# Patient Record
(demographics unavailable — no encounter records)

---

## 2017-01-10 NOTE — NUR
RECEIVED TO 2126 FROM ER VIA WHEELCHAIR, AAOX3, SKIN WARM AND DRY, RESP
UNLABORED, IV PATENT TO RIGHT FOREARM, VISITOR AT BEDSIDE, TELEMETRY SHOWING
SINUS, NO DISTRESS NOTED

## 2017-01-10 NOTE — HEMODYNAMI
PATIENT:KARIE ARMSTRONG                                  MEDICAL RECORD: [id number]
: 10/24/37                                            [id number]                                       ADMISSION DATE: 01/10/17
 
 
 Generatedon:201711:33
Patient name: KARIE ARMSTRONG Patient #: [id number] Visit #: [id number] SSN: 3
52- :
1937 Date of study: 2017
Page: Of
Hemodynamic Procedure Report
****************************
Patient Data
Patient Demographics
Procedure consent was obtained
First Name: KARIE          Gender: Female
Last Name: PATTI         : 1937
Patient #: [id number]       Age: 79 year(s)
Visit #: [id number]       Race: Unknown
SSN: 
Accession #:
[id number]
Additional ID: [id number]
Contact details
Address: [address] [place] [address]  Phone: [phone]
State: [place]
City: [place]
Zip code: [address]
Past Medical History
Allergies
Allergen        Reaction        Date         Comments
Reported
Other allergy                   2017    Sulfa
Admission
Admission Data
Admission Date: 1/10/2017   Admission Time: 22:39
Arrival Date: 2017     Arrival Time: 10:00
Admit Source: Emergency
department
Room #: D.2126
Height (in.): 65            BSA: 1.59 (m2)
Height (cm.): 165.1         BMI: 19.81 (kg/m2)
Weight (lbs.): 119.05
Weight (kg.): 54
Lab Results
Lab Result Date: 2017  Lab Result Time: 0:00
Biochemistry
Name         Units    Result                Min      Max
BUN          mg/dl    22       --(----)-*   7        18
Creatinine   mg/dl    1.3      --(---*)--   0.6      1.3
CBC
Name         Units    Result                Min      Max
Hemoglobin   g/dl     14.3     --(*---)--   13.5     17.5
Procedure
Procedure Types
Cath Procedure
Diagnostic Procedure
[facility]
LH w/Coronaries
 
FFR/IVUS
PCI Procedure
Coronary Stent Initial
Procedure Description
Procedure Date
Procedure Date: 2017
Procedure Start Time: 11:09
Procedure End Time: 11:30
Procedure Staff
Name                            Function
Oleg Joel MD                Performing Physician
Marietta Combs RT             Scrub
Masoud Mckeon RN                  Nurse
Daniel Hay RN                Circulator
Cathy Herrera RT               Monitor
Agus Carter RT                  Monitor
Procedure Data
Cath Procedure
Fluoroscopy
Diagnostic fluoroscopy      Total fluoroscopy Time: 5.3
time: 5.3 min               min
Diagnostic fluoroscopy      Total fluoroscopy dose: 411
dose: 411 mGy               mGy
Contrast Material
Contrast Material Type                       Amount (ml)
Isovue 300                                   87
Entry Location
Entry     Primary  Successful  Side  Size  Upsize Upsize Entry    Closure     Barrett
ccessful  Closure
Location                             (Fr)  1 (Fr) 2 (Fr) Remarks  Device        
          Remarks
Radial                         Right 6 Fr
artery                               Short
Femoral                        Right 6 Fr                         Mechanical    
          tr band
artery                               Short                        Compression
Estimated blood loss: 10 ml
Diagnostic catheters
Device Type               Used For           End Catheter
Placement
Terumo 5Fr Tiger 110cm    LV Angiography
catheter
Terumo 5Fr Denver 110cm    Right Coronary
catheter                  Angiography
Terumo 5Fr Denver 110cm    Left Coronary
catheter                  Angiography
Procedure Complications
No complications
Procedure Medications
Medication           Administration Route Dosage
Oxygen               NC                   2 l/min
Zofran               I.V.                 4 mg
0.9% NaCl            I.V.                 100 ml/hr
Lidocaine 2%         added to field       20
Heparin Flush Bag    added to field       2 bags
(1000units/500ml NS)
Versed               I.V.                 1 mg
Fentanyl             I.V.                 50 mcg
Radial Cocktail      I.A.                 1 syringe
(Verapomil 2mg/Nitro
 
400mcg/Heparin
1500units)
Versed               I.V.                 1 mg
Fentanyl             I.V.                 50 mcg
Heparin Bolus        I.V.                 4000 units
Versed               I.V.                 0.5 mg
0.9% NaCl            I.V. bolus           250 ml
Hemodynamics
Rest
BSA: 1.59 (m2) HGB: 14.3 (g/dl) O2 Consumption: Estimated: 162.03 (ml/min) O2 Co
nsumption indexed:
Estimated:101.91 (ml/min/m) Heart Rate: 101 (bpm)
Pressure Samples
Time     Site     Value (mmHg) Purpose      Heart      Use
Rate(bpm)
11:12    LV       67/12,12     Snapshot     83
Snapshots
Pre Cath      Intra         NCS           Post Cath
Vital Signs
Time     Heart  Resp   SPO2 NIBP (mmHg) Rhythm  Pain    Sedation
Rate   (ipm)  (%)                      Status  Level
(bpm)
10:46:23 99     17     100  148/82(114) NSR     0 (11)  10(A)
, No
pain
10:50:35 98     16     100  147/79(113) NSR     0 (11)  10(A)
, No
pain
10:54:49 92     15     99   135/74(103) NSR     0 (11)  10(A)
, No
pain
10:59:01 91     18     99   119/64(92)  NSR     0 (11)  9(A)
, No
pain
11:03:05 93     17     99   137/72(96)  NSR     0 (11)  9(A)
, No
pain
11:08:19 89     16     99   99/62(75)   NSR     0 (11)  9(A)
, No
pain
11:11:23 88     15     99   93/52(70)   NSR     0 (11)  9(A)
, No
pain
11:15:25 92     17     98   87/49(80)   NSR     0 (11)  9(A)
, No
pain
11:19:24 88     16     99   89/48(65)   NSR     0 (11)  9(A)
, No
pain
11:23:22 86     15     98   98/58(76)   NSR     0 (11)  9(A)
, No
pain
11:27:23 83     16     98   115/57(81)  NSR     0 (11)  10(A)
, No
pain
Medications
Time     Medication       Route  Dose    Verified Delivered Reason          Note
s    Effectiveness
by       by
10:45:17 Oxygen           NC     2 l/min Oleg  Buffie    used for
 
Marycruz Mckeon RN   procedure
10:45:25 Zofran           I.V.   4 mg    Olegalberto Meredith    Per physician   pt
Marycruz Mckeon RN                   states
n/v with
sedation
10:45:34 0.9% NaCl        I.V.   100     Oleg  Buffie    Per physician
ml/hr   Marycruz Mckeon RN
10:56:35 Lidocaine 2%     added  20ml    Oleg Dejesus   for local
to     vial    Marycruz Joel MD  anesthetic
field
10:56:41 Heparin Flush    added  2 bags  Oleg Dejesus   used for
Bag              to             Marycruz Joel MD  procedure
(1000units/500ml field
NS)
11:07:31 Versed           I.V.   1 mg    Oleg  Buffie    for sedation
Marycruz Mckeon RN
11:07:38 Fentanyl         I.V.   50 mcg  Olegalberto Weinbergie    for sedation
Marycruz Mckeon RN
11:09:15 0.9% NaCl        I.V.   250 ml  Oleg  Buffie    Per physician
bolus          Marycruz Mckeon RN
11:11:08 Radial Cocktail  I.A.   1       Oleg Dejesus   for
(Verapomil              syringe Marycruz Joel MD  vasodilation
2mg/Nitro
400mcg/Heparin
1500units)
11:11:15 Versed           I.V.   1 mg    Oleg Meredith    for sedation
Marycruz Mckeon RN
11:11:18 Fentanyl         I.V.   50 mcg  Oleg Meredith    for sedation
Marycruz Mckeon RN
11:16:22 Heparin Bolus    I.V.   4000    Oleg Meredith    for             veri
fied
units   Marycruz Mckeon RN   anticoagulation with dr joel
11:19:34 Versed           I.V.   0.5 mg  Oleg Meredith    for sedation
Marycruz Mckeon RN
Procedure Log
Time     Note
10:10:36 Daneil Hay RN sent for patient. Start room use.
10:27:06 Diagnostic Cath Status : Elective
10:27:31 Admit Source: Emergency department
10:27:43 Time tracking: Regular hours
10:27:48 Plan of Care:Hemodynamics will remain stable., Cardiac
rhythm will remain stable., Comfort level will be
maintained., Respiratory function will remain
adequate., Patient/ family verbilizes understanding of
procedure., Procedure tolerated without complication.,
Recovers from procedure without complications..
10:33:26 Patient received from Med II to CCL 2 Alert and
oriented. Tansferred to table in Supine position.
10:33:29 Warm blankets applied, and jamir hugger turned on for
patient comfort.
10:33:33 Correct patient and procedure confirmed by team.
10:33:35 Signed procedure consent form obtained from patient.
10:33:48 H&P Date Dictated: 2017 Within 30 days and on
chart..
10:33:53 Pre-procedure instructions explained to patient.
10:33:56 Pre-op teaching completed and patient verbalized
understanding.
10:33:58 Family in waiting room.
10:34:00 Patient NPO since Midnight.
 
10:34:14 Patient allergic to Other allergySulfa
10:34:23 Is the patient allergic to Iodine/contrast media? No.
10:34:27 Is patient on blood thinner?Yes
10:34:33 **ACC** The patient was administered the following
blood thiners within the last 24 hours: **ACC**Plavix
10:36:03 Patient diabetic? No.
10:36:12 Previous problem with sedation/anesthesia? N/A nausea
10:36:15 Snore? Unknown
10:36:17 Sleep apnea? Yes
10:36:19 Deviated septum? No
10:36:21 Opens mouth fully? Yes
10:36:24 Sticks out tongue? Yes
10:36:32 Dentures? No ?
10:37:06 IV right forearm D/C'd due to need to relocate for
procedure..
10:37:29 IV started by Masoud Mckeon RN inleft forearm with a 22
gauge IV catheter with 0.[facility]% [facility] at [facility].
10:38:06 Lab Result : BUN 22 mg/dl
10:38:06 Lab Result : Hemoglobin 14.3 g/dl
10:38:06 Lab Result : Creatinine 1.3 mg/dl
10:40:10 Lab results completed and on chart.
10:40:15 Right Radial & Right Groin area was prepped with
chlora-prep and draped in sterile fashion
10:40:20 Alarms reviewed by R. N.
10:40:21 Sharps counted by scrub and verified by R.N.
10:41:20 ECG and BP/O2 sat monitors applied to patient.
10:41:22 Vital chart was started
10:41:30 Baseline sample Acquired.
10:41:39 Rhythm: sinus rhythm
10:41:41 Full Disclosure recording started
10:45:17 Oxygen 2 l/min NC was given by Masoud Mckeon RN; used
for procedure;
10:45:25 Zofran 4 mg I.V. was given by Masoud Mckeon RN; Per
physician; pt states n/v with sedation
10:45:34 0.9% NaCl 100 ml/hr I.V. was given by Masoud Mckeon RN;
Per physician;
10:51:10 Arrival Date: 2017 10:00:00 AM
10:51:29 Patient Height : 65 cm
10:51:35 Patient Weight : 119.05 kg
10:53:09 Physician paged
10:56:35 Lidocaine 2% 20ml vial added to field was given by
Oleg Joel MD; for local anesthetic;
10:56:41 Heparin Flush Bag (1000units/500ml NS) 2 bags added to
field was given by Oleg Joel MD; used for
procedure;
10:56:43 Use device set Radial Dx
10:56:44 Acist Syringe opened to sterile field.
10:56:44 Cardinal Cath Pack opened to sterile field.
10:56:45 Bag Decanter opened to sterile field.
10:56:45 Terumo 6Fr Slender Glidesheath opened to sterile
field.
10:56:47 St Colin 260cm J .035 wire opened to sterile field.
10:56:48 Acist Hand Control opened to sterile field.
10:56:49 Acist Manifold opened to sterile field.
10:56:51 Tegaderm 4 x 4 opened to sterile field.
10:57:38 Physician responded to page.
10:59:17 Zero performed for pressure channel P1
11:06:59 Physician arrived
11::59 --------ALL STOP TIME OUT------
11:07:00 Final Timeout: patient, procedure, and site verified
 
with staff and physician. All members of the team are
in agreement.
11:07:04 Right Radial & Right Groin site verified by team.
11:07:07 Physical assessment completed. ASA score P 2 - A
patient with mild systemic disease as per Oleg Joel MD.
11:07:12 Sedation plan: IV Moderate Sedation Versed, Fentanyl
11:07:31 Versed 1 mg I.V. was given by Masoud Mckeon RN; for
sedation;
11::38 Fentanyl 50 mcg I.V. was given by Masoud Mckeon RN; for
sedation;
11:09:09 Procedure started.
11:09:15 0.9% NaCl 250 ml I.V. bolus was given by Masoud Mckeon
RN; Per physician;
11:09:24 Local anesthetic to right radial artery with Lidocaine
2% by Oleg Joel MD.**INITIAL ACCESS ONLY**
11:09:38 A 6 Fr Short sheath was inserted into the Right Radial
artery
11:10:03 J wire advanced.
11:11:06 A Terumo 5Fr Denver 110cm catheter was advanced over
the wire and used for LV Angiography.
11:11:08 Radial Cocktail (Verapomil 2mg/Nitro 400mcg/Heparin
1500units) 1 syringe I.A. was given by Oleg Joel MD; for vasodilation;
11:11:15 Versed 1 mg I.V. was given by Buffie Mckeon RN; for
sedation;
11:11:18 Fentanyl 50 mcg I.V. was given by Masoud Mckeon RN; for
sedation;
11:12:28 EF : 55 %
11:12:30 LV Function : Normal
11:12:48 EF : 55 %
11:13:06 A Terumo 5Fr Denver 110cm catheter was advanced over
the wire and used for Right Coronary Angiography.
11:13:24 A Terumo 5Fr Tiger 110cm catheter was advanced over
the wire and used for Left Coronary Angiography.
11:14:19 Volcano Platinum Eagleye IVUS Catheter opened to
sterile field.
11:15:28 Cordis 6FR XBLAD 3.5 guide catheter opened to sterile
field.
11:15:29 Merit BasixCompak Inflation Kit opened to sterile
field.
11:16:15 Mathew Whisper J 300cm 0.014 guide wire opened to
sterile field.
11:16:22 Heparin Bolus 4000 units I.V. was given by Masoud Mckeon
RN; for anticoagulation; verified with dr joel
11:16:53 Terumo 6Fr Sonora Sheath opened to sterile field.
11:17:05 Local anesthetic to right femoral artery with
Lidocaine 2% by Oleg Joel MD.**ADDITIONAL ACCESS**
11:17:57 unable to advance guide through radial artery. Moved
to Femoral artery.
11:18:40 St Colin 260cm J .035 wire opened to sterile field.
11:19:03 A 6 Fr Short sheath was inserted into the Right
Femoral artery
11:19:15 6 Fr XBLAD 3.5 guide catheter was inserted over the
wire
11:19:34 Versed 0.5 mg I.V. was given by Masoud Mckeon RN; for
sedation;
11:19:38 Whisper wire advanced.
11:19:47 IVUS catheter advanced over wire.
11:19:52 IVUS pass to LAD lesion performed.
 
11:21:05 IVUS catheter removed over wire.
11:21:36 PCI Cath status Elective
11:22:37 **ACC** PCI Site: pLAD has 65% stenosis.
11:22:41 **ACC** Pre-intervention DENIZ Flow is 3.
11:22:51 Wire advanced across lesion.
11:25:08 Inflation Number: 1 A Medtronic Resolute 2.5 X 14
stent was prepped and advanced across the Mid LAD. The
stent was deployed at 17 CONG for 0:00 (min:sec).
11::24 Stent catheter was removed intact over wire.
11::30 Wire removed.
11::31 Guide catheter removed.
11:27:10 Terumo TR Band Standard opened to sterile field.
11:27:16 Cordis 6Fr Exoseal opened to sterile field.
11:27:34 Sheath removed intact; hemostasis achieved with
Mechanical Compression to the Right Femoral artery.
11::57 Procedure ended.(Physican Out)
:: Fluoroscopy time 05.30 minutes.
11::15 Fluoroscopy dose: 411 mGy
11::15 Flurop Dose total: 411
11::22 Contrast amount:Isovue 300 87ml.
11::24 Sharps counted by scrub and verified by R.N.
11:28:32 TR band inflated with 10cc of air.
11:28:37 Insertion/operative site no bleeding no hematoma.
11:28:46 Post-op/insertion site Right Femoral artery dressed
using a 4 x 4 and Tegaderm.
11:28:52 Post right femoral artery:stable
11:28:58 Post right radial artery:stable
11:29:00 Post Procedure Pulses reassessed and unchanged
11:29:06 Post-procedure physical assessment completed. ASA
score P 2 - A patient with mild systemic disease as
per Oleg Joel MD.
11:29:10 Post procedure rhythm: unchanged.
11:29:13 Estimated blood loss: 10 ml
11:29:15 Post procedure instruction explained to
patient.Patient verbalizes understanding.
11:29:16 Patient needs reinforcement of post procedure
teaching.
11:29:38 Procedure type changed to Cath procedure, Diagnostic
procedure, LHC, LHC w/Coronaries, FFR/IVUS, PCI
procedure, Coronary Stent Initial
11:29:40 Procedure and supply charges have been captured,
reviewed, submitted and are correct.
11::46 Procedure Complication : No complications
11::07 Vital chart was stopped
11::08 See physician's report for complete and final results.
11:30:14 Report given to [facility].
11:30:19 Patient transfered to [facility] with Stretcher.
11:30:21 Procedure ended.
11:30:21 Full Disclosure recording stopped
11::25 End room use (Document Last)
Intervention Summary
Intervention Notes
Time     ActionType  Lesion and  Equipment Action#  Pressure  Duration
Attributes  Used
11:25:08 Place stent Mid LAD     Medtronic 1        17        00:00
Resolute
2.5 X 14
stent
Device Usage
Item Name   Manufacture  Quantity  Catalog      Hospital Part    Current Minimal
 
 Lot# /
Number       Charge   Number  Stock   Stock   Serial#
Code
Acist       Acist        1         [id number]        454477   727299  [id number]  20
Syringe     Medical
Systems Inc
Cardinal    Cardinal     1         [id number]   502581   69430   318858  5
Cath Pack   Health
Bag         Microtek     1         2002S        831928   53024   865541  5
Decanter    Medical Inc.
Terumo 6Fr  Terumo       1         [id number]   646625   441188  486067  40
Slender
Glidesheath
St Colin     St Colin      2         349598       714601   169357  145172  30
260cm J
.035 wire
Acist Hand  Acist        1         56324        504889   330105  226570  5
Control     Medical
Systems Inc
Acist       Acist        1         01305        477537   306101  244664  5
Manifold    Medical
Systems Inc
Tegaderm 4  3M           1         1626W        316748   076786  151438  5
x 4
IV          Hospira      1         39406-79     288999   85982   791616  5
Extension
Set
Terumo 5Fr  Terumo       1               999526   105319  747029  5
Tiger 110cm
catheter
Volcano     [place]      1         67312Q       097993   814787  705621  8
Platinum
Eagleye
IVUS
Catheter
Cordis 6FR  Cardinal     1         [id number]     719563   133110  277653  10
XBLAD 3.5   Health
guide
catheter
Merit       Merit        1         [id number]       720498   416431  234383  15
[facility] Medical
Inflation
Kit
Mathew      Mathew       1         [id number]    076878   681840  890952  5
Whisper J   Vascular
300cm 0.014
guide wire
Terumo 6Fr  Terumo       1         [id number]       437641   202086  147633  40
Sonora
Sheath
Medtronic   Medtronic    1         [id number]  086398           071491  1      
 [id number]
Resolute
2.5 X 14
stent
Terumo TR   Terumo       1         [id number]    520006   546888  536696  40
Band
Standard
Cordis 6Fr  Cardinal     1                 396190   399045  416240  10
[facility]     [employer]
 
Signature Audit Jachin
Stage           Time        Signature      Unsigned
Intra-Procedure 2017   Cathy Herrera
11:33:24 AM RT(R)
Signatures
Monitor : Cathy Herrera Signature :
RT                       ______________________________
Date : ______________ Time :
______________
Monitor : Agus Carter RT Signature :
______________________________
Date : ______________ Time :
______________
 
 
 
 
 
 
 
 
 
 
 
 
 
 
 
 
 
 
 
 
 
 
 
 
 
 
 
 
 
 
 
 
 
 
 
 
 
 
 
 
 
 
[place] [address] [place] [address]

## 2017-01-11 NOTE — NUR
PT LAYING IN BED EYES CLOSED NO DISTRESS OBSERVED RESPERATIONS EVEN AND
UNLABORED BED LOW AND LOCKED SRX2 WILL MONITOR

## 2017-01-13 NOTE — OP
PATIENT NAME:  KARIE ARMSTRONG                           MEDICAL RECORD: [id number]
:10/24/37                                             LOCATION:D.ER           
                                                         ADMISSION DATE:        
SURGEON:  MICHI ELLIOTT MD             
 
 
DATE OF OPERATION:  2017
 
PROCEDURES:
1.  PTCA, stent LAD.
2.  Intravascular ultrasound of the LAD.
3.  Left heart catheterization.
4.  Selective coronary angiography.
5.  Left ventriculogram.
 
INDICATION:  Angina and coronary artery disease.
 
PROCEDURE IN DETAIL:  After informed consent was obtained and after detailed
explanation of risks, benefits, as well as alternative therapies, the patient
elected to proceed with angiogram and angioplasty.  The right femoral area was
prepped and draped in normal sterile fashion.  The right femoral artery was
cannulated via modified Seldinger technique with placement of 6-Andorran sheath. 
All catheters exchanged through this sheath.
 
FINDINGS:  The left ventriculogram was performed in the standard 30-degree ROSARIO
view reveals good cardiac wall motion throughout all segments.  Overall ejection
fraction estimated at 60%.
 
SELECTIVE CORONARY ANGIOGRAPHY:
1.  Left main showed no significant angiographic disease.
2.  Left anterior descending had irregularities proximally followed by a 75%
stenosis in the mid vessel.  The proximal irregularities, no greater than 30% by
intravascular ultrasound.
3.  Left circumflex shows moderate irregularities, but no flow-limiting
stenosis.
4.  Right coronary has moderate irregularities, but no flow-limiting stenosis.
 
PTCA STENT OF THE LAD:  The stent used was a 2.5 x 14-mm Resolute.  Result was
0% residual stenosis.
 
OVERALL IMPRESSION:  Successful percutaneous transluminal coronary angioplasty
stent of the left anterior descending going from greater than 70% initial
stenosis in the mid vessel to 0% residual.
 
TRANSINT:[id number] Voice Confirmation ID: [id number] DOCUMENT ID: [id number]
                                           
                                           MCIHI ELLIOTT MD             
 
 
 
Electronically Signed by MICHI ELLIOTT on 17 at 1111
CC:                                                             [id number]
DICTATION DATE: 17 1138     :     17 1159      [facility] CLI 
                                                                      17
[place] [id number]

## 2017-01-13 NOTE — CN
PATIENT NAME:KARIE ARMSTRONG                               MEDICAL RECORD: [id number]
: 10/24/37                                              LOCATION:.ER       
ADMIT DATE:                                                ACCOUNT: [id number]
CONSULTING PHYSICIAN:    MICHI ELLIOTT MD             
                                               
REFERRING PHYSICIAN:     RALPH GARCIA DO            
 
 
DATE OF CONSULTATION:  2017
 
Cardiology Consultation
 
DIAGNOSES:
1.  Non-Q-wave myocardial infarction.
2.  Coronary artery disease.
3.  Hypertension.
4.  Hyperlipidemia.
 
HISTORY OF PRESENT ILLNESS:  Mrs. Armstrong presents with 2 weeks of chest pain,
worsening.  Troponin is positive for a non-Q-wave myocardial infarction.  She
continues to have episodes of pain.  She does not have any acute EKG changes. 
She is on gemfibrozil for cholesterol, intolerant to statins in the past.  She
is on Calan for her blood pressure.  She has had bradycardia to the 50s since
being here.
 
PHYSICAL EXAMINATION:
GENERAL APPEARANCE:  Well-nourished, well-developed, appears stated age.  Level
of distress, comfortable. 
PSYCHIATRIC:  Mental status, alert, normal affect.  Orientation, oriented to
time, place and person.  
EYES:  Lids and conjunctiva, noninjected.  No discharge, no pallor. 
ENT:  Lips, teeth, gums, normal dentition.  Oropharynx, no cyanosis, no pallor. 
NECK:  Carotid arteries, bilateral normal upstroke, no bruits, no thrills. 
JUGULAR VEINS:  No jugular venous pressure or distention. 
CERVICAL LYMPH NODES:  Nontender, nonenlarged.  
THYROID:  Not enlarged.  Nontender.  No nodules. 
LUNGS:  Respiratory effort, unlabored. 
CHEST:  Normal curvature.  No thoracic deformity.  No chest wall tenderness. 
Percussion, resonant.  Auscultation, clear.  No wheezes, no rales, no rhonchi. 
CARDIOVASCULAR:  Precordial exam, nondisplaced.  No heaves or pericardial
thrills.  Rate and rhythm, regular.  Heart sounds, normal S1, normal S2.  No S3,
no gallop, no rub.  Systolic murmur, not heard.  Diastolic murmur, not heard. 
EXTREMITIES:  No cyanosis, no edema.  Peripheral pulses, full and equal in all
extremities, except as noted.  No bruits appreciated. 
ABDOMEN:  Soft, nondistended.  Normal aorta.  No bruit.  Nontender.  No masses. 
Liver, nontender, no hepatomegaly.  Spleen, nontender, no splenomegaly.  
MUSCULOSKELETAL:  No joint tenderness.  No joint swelling.  No erythema.  
NEUROLOGICAL:  Normal gait, normal strength, normal tone.
SKIN:  Warm and dry.
 
REVIEW OF SYSTEMS:  The patient reports easy bruising but reports no swollen
glands. The patient reports no fever, no night sweats, no significant weight
gain, no significant weight loss.  No significant exercise tolerance.  The
patient reports no dry eyes, no irritation, no vision change.  Patient reports
no difficulty hearing and no ear pain.  Patient reports no frequent nose bleeds
or nose and sinus problems.  Patient reports on arm pain on exertion.   No
shortness of breath while lying down.  No history of heart murmur.  Patient
reports no cough, no wheezing or coughing up blood.  Patient reports no
 
 
 
CONSULT REPORT                                 [id number]    SCHWKARIE BLAS             
 
 
abdominal pain, no vomiting.  Normal appetite.  No diarrhea and not vomiting
blood.  No nausea and no constipation.  Patient reports no incontinence.  No
difficulty urinating.  No hematuria.  No increased frequency.  Patient reports
no muscle aches.  No weakness, no arthralgias, no back pain.  No swelling of the
extremities.  Patient reports no abnormal mole, no jaundice, no rashes.  Reports
no loss of consciousness.  No weakness and no numbness.  No seizures, dizziness,
or headaches.  The patient reports no depression, no sleep disturbance, feeling
safe in a relationship and no alcohol abuse.  Patient reports on fatigue. 
Reports no runny nose or sinus pressure.  No itching, no hives, and no frequent
sneezing.  
 
OVERALL IMPRESSION:  Non-Q-wave myocardial infarction.  We will proceed with
coronary angiography.  We will load her with Plavix now.  Continue aspirin and
further care depends upon findings of the cardiac catheterization.  We will not
use a beta blocker due to heart rate in the 50s and bradycardia.  We will not
use a statin due to intolerance to this in the past.
 
TRANSINT:[id number] Voice Confirmation ID: [id number] DOCUMENT ID: [id number]
                                           
                                           MICHI ELLIOTT MD             
 
 
 
Electronically Signed by MICHI ELLIOTT on 17 at 1111
 
 
 
 
 
 
 
 
 
 
 
 
 
 
 
 
 
 
 
 
 
 
 
CC:                                                             [id number]
DICTATION DATE: 1734     :     17 0846      DEP CLI 
                                                                      [address]
[place] [address]

## 2017-01-13 NOTE — DS
PATIENT:KARIE GLASS                   :10/24/37   MEDICAL RECORD: [id number]
 
                              DISCHARGE SUMMARY
                                                         
ADMISSION DATE:    01/10/17                       DISCHARGE DATE:     17
 
 
DATE OF SERVICE:  2017
 
DISCHARGE DIAGNOSES:
1.  Non-Q-wave myocardial infarction.
2.  Coronary artery disease.
3.  Status post percutaneous transluminal coronary angioplasty stent of the left
anterior descending this admission.
4.  Hypertension.
5.  Hyperlipidemia.
 
HOSPITAL COURSE:  Mrs. Glass presents after 2 weeks of chest pain worsened
with a non-Q-wave myocardial infarction, underwent cardiac catheterization
revealing significant disease of the LAD, underwent PTCA stent of the LAD, had
an uneventful postop course.  She was discharged home with the addition of
aspirin and Plavix to her medical regimen.  [facility].
 
TRANSINT:[id number] Voice Confirmation ID: [id number] DOCUMENT ID: [id number]
                                           
                                           MICHI ELLIOTT MD             
 
 
 
Electronically Signed by MICHI ELLIOTT on 17 at 1111
 
 
 
 
 
 
 
 
 
 
 
 
 
 
 
 
 
 
 
CC:                                                             [id number]
DICTATION DATE: 17 1136     :     17 1211      DEP CLI 
                                                                      [id number] [place] [address]